# Patient Record
(demographics unavailable — no encounter records)

---

## 2024-10-23 NOTE — END OF VISIT
[FreeTextEntry3] : I, Mark Hurst MD, personally saw and examined this patient. I developed the treatment plan and authored this note.

## 2024-10-23 NOTE — ASSESSMENT
[FreeTextEntry1] : 15-year-old male with right lateral malleolus fracture with widening and lateral translation of the talus on the gravity stress view (bimalleolar equivalent ankle fracture) sustained on 10/16/2024 while participating in football.  -We discussed KWAN' history, physical exam, and all available radiographs at length during today's visit with patient and his parent/guardian who served as an independent historian due to child's age and unreliable nature of history. -Documentation of [facility] emergency department was reviewed today -Right ankle, knee, and tibia/fib radiographs obtained at outside facility on 10/16/2024 were independently reviewed today.  They are remarkable for an acute minimally displaced spiral fracture of the distal right fibula metadiaphysis.  Soft tissue edema surrounding the ankle joint. -Right ankle radiographs, 3 views, in splint were obtained and independently reviewed during today's visit.  Again noted is the acute minimally displaced spiral fracture about the lateral malleolus.  No evidence of periosteal reaction or bridging callus formation.  No definitive widening of the medial clear space. -Right ankle GRAVITY STRESS mortise view out of splint was also obtained and independently reviewed today.  Noted is lateral translation of the talar dome with approximately 6.5 to 7 mm widening about the medial clear space.  Increased diastases about the lateral malleolus fracture. -The etiology, pathoanatomy, treatment modalities, and expected natural history of the injury were discussed at length today. -Clinically, he is over doing well.  He has persistent moderate swelling about the right ankle with medially and laterally based discomfort. -We discussed his fracture morphology and current alignment at length during today's visit.  A gravity stress view was performed which was remarkable for increased diastases about the lateral malleolus with lateral translation of the talus and increase in medial clear space.  This is consistent with disruption of the deltoid ligament and a bimalleolar equivalent type ankle fracture.  Given inherent instability of bimalleolar equivalent ankle fractures, we recommended proceeding with formal operative intervention.  Continued conservative management carries increased risk of a poor functional outcome, chronic ankle pain, chronic ankle instability, and/or early onset posttraumatic arthritis.  Goals of surgery are to restore ankle stability and anatomic alignment.  The procedure of open reduction internal fixation of the lateral malleolus was discussed at length during today's visit.  The risks and benefits were also discussed.  The risks include, but are not limited to, hardware failure, malunion, nonunion, scarring, infection, wound dehiscence, chronic pain, chronic ankle stiffness, chronic ankle instability, chronic limp, hardware prominence, need for additional surgical procedures, damage nearby nerves, vessels, tissues.  Specifically, we discussed risk of injury to the superficial peroneal nerve which courses in the geographic location of the planned surgical field.  We also discussed the following fixation of the lateral malleolus, an intraoperative stress test of the ankle will be performed to assess stability of the syndesmotic ligaments.  If additional instability is found at that time, we will proceed with fixation of the syndesmosis.  Postoperative course was also discussed including initial need for cast immobilization and nonweightbearing restrictions.  The patient and his family asked appropriate questions, all of which were answered in detail.  They elected to proceed. -Our surgical schedulers will contact the family to arrange a surgical date.  All preoperative paperwork was completed today. -In the interim, he was placed into a well-padded and molded short leg cast.  Cast care instructions reviewed. -Strict nonweightbearing on the right lower extremity.  Crutches/wheelchair at all times. -Rest and elevation -OTC NSAIDs as needed -Absolutely no gym, sports, rough play, etc.  School note provided today for accommodations and transportation. -We will plan to see Kwan back in clinic for his first postoperative follow-up   The above plan was discussed at length with the patient and his family. All questions were answered. They verbalized understanding and were in complete agreement.

## 2024-10-23 NOTE — REVIEW OF SYSTEMS
[Change in Activity] : change in activity [Limping] : limping [Joint Pains] : arthralgias [Joint Swelling] : joint swelling  [Fever Above 102] : no fever [Malaise] : no malaise [Rash] : no rash [Itching] : no itching [Eye Pain] : no eye pain [Redness] : no redness [Nasal Stuffiness] : no nasal congestion [Heart Problems] : no heart problems [Murmur] : no murmur [Wheezing] : no wheezing [Cough] : no cough [Asthma] : no asthma [Vomiting] : no vomiting [Diarrhea] : no diarrhea [Constipation] : no constipation [Kidney Infection] : no kidney infection [Bladder Infection] : no bladder infection [Sleep Disturbances] : ~T no sleep disturbances

## 2024-10-23 NOTE — HISTORY OF PRESENT ILLNESS
[FreeTextEntry1] : Kwan is a 15-year-old male who presents to clinic today for initial evaluation of a right ankle injury.  Per report, he was participating in football on 10/16/2024 when another player fell onto his right leg and he subsequently twisted his ankle.  He felt a pop followed by immediate pain and inability to bear weight.  There was near immediate swelling about the ankle.  He then presented to an urgent care center where radiographs were concerning for a displaced ankle fracture.  He then presented to the [facility] [place] department where radiographs confirmed a fracture of the lateral malleolus.  He was noted to be moderately swollen.  He was grossly neurovascularly intact.  He was placed into a trilaminar splint.  He was discharged home and referred to our office for further evaluation and management.  Today, Kwan presents to the office with both of his parents.  He continues to localize discomfort to the right ankle.  He has been compliant with all splint care instructions and activity restrictions.  He has remained fully nonweightbearing on the right lower extremity.  He has been elevating the right lower extremity.  He is able to actively flex and extend all toes while in the splint without difficulty or discomfort.  He denies any pain about the ipsilateral knee or hip.  No pain in any other extremity.  He also denies any numbness or tingling throughout the entirety of the right lower extremity.   used no

## 2024-10-23 NOTE — PHYSICAL EXAM
[FreeTextEntry1] : GENERAL: alert, cooperative, in NAD SKIN: The skin is intact, warm, pink and dry over the area examined. EYES: Normal conjunctiva, normal eyelids and pupils were equal and round. ENT: normal ears, normal nose and normal lips. CARDIOVASCULAR: brisk capillary refill, but no peripheral edema. RESPIRATORY: The patient is in no apparent respiratory distress. They're taking full deep breaths without use of accessory muscles or evidence of audible wheezes or stridor without the use of a stethoscope. Normal respiratory effort. ABDOMEN: not examined.  Right Lower Extremity: - Provisional splint was in place. Removed today for examination and stress radiographs. - No gross deformity - Significant swelling over lateral malleolus and medial ankle - No ecchymoses, no erythema - No breaks in skin, no abrasions.  No fracture blisters.  Positive skin wrinkling. - Significant tenderness to palpation over lateral malleolus and deltoid ligament - No tenderness over tibial shaft, proximal fibula, medial malleolus, or remainder of foot - Ankle ROM is deferred due to guarding and laterally based pain - Able to flex/extend all toes without discomfort - EHL/FHL is intact and 5/5 - Significant discomfort with external rotation stress test - Foot is warm and appears well perfused - 2+ and easily palpable dorsalis pedis and posterior tibial pulses - Sensation is grossly intact throughout the right lower extremity including in the superficial peroneal, deep peroneal, tibial, saphenous, and sural nerve distributions - No evidence of lymphedema  Gait: Patient presents to the office ambulating with the assistance of bilateral crutches maintaining strict nonweightbearing precautions on the right lower extremity.

## 2024-10-23 NOTE — REASON FOR VISIT
[Initial Evaluation] : an initial evaluation [Patient] : patient [Parents] : parents [FreeTextEntry1] : Right lateral malleolus fracture, bimalleolar equivalent injury. Date of injury: 10/16/2024

## 2024-10-23 NOTE — DATA REVIEWED
[de-identified] : Right ankle radiographs, 3 views, in splint were obtained and independently reviewed during today's visit.  Again noted is the acute minimally displaced spiral fracture about the lateral malleolus.  No evidence of periosteal reaction or bridging callus formation.  No definitive widening of the medial clear space.  Right ankle GRAVITY STRESS mortise view out of splint was also obtained and independently reviewed today.  Noted is lateral translation of the talar dome with approximately 6.5 to 7 mm widening about the medial clear space.  Increased diastases about the lateral malleolus fracture.  Right ankle, knee, and tibia/fib radiographs obtained at outside facility on 10/16/2024 were independently reviewed today.  They are remarkable for an acute minimally displaced spiral fracture of the distal right fibula metadiaphysis.  Soft tissue edema surrounding the ankle joint.

## 2024-11-19 NOTE — POST OP
[___ Weeks Post Op] : [unfilled] weeks post op [0] : no pain reported [Nausea] : no nausea [Vomiting] : no vomiting [Excellent Pain Control] : has excellent pain control [No Sign of Infection] : is showing no signs of infection [de-identified] : Open reduction and internal fixation of right lateral malleolus fracture. DOS: 10/31/24 [de-identified] : Kwan is a 15-year-old male who sustained a right lateral malleolus fracture with widening and lateral translation of the talus on the gravity stress view XRs (bimalleolar equivalent ankle fracture) sustained on 10/16/2024 while participating in football. He was indicated for surgical intervention and underwent R distal fibula ORIF on 10/31/24, and was placed into a SLC. He tolerated procedure well.   Today he returns for post operative f/u. He reports doing well. He denies pain, he is no longer needing any pain medication. He denies numbness/tingling. No fevers. Tolerating cast well, compliant with NWB RLE, using crutches or a scooter. [de-identified] : RLE:  Cast is well fitting, c/d/i. The padding is intact with no signs of skin irritation. No pressure sores or abrasions noted around the cast. There is no swelling. NVI, SILT. Moving digits freely.  WWP distally, brisk cap refill Examination of pulses is deferred due to overlying cast material. [de-identified] : XR R ankle 3 views IN CAST obtained and independently reviewed in our office today: R fibular fracture well reduced with hardware in place. Mortise intact. [de-identified] : 15 yo M approximately 2 weeks s/p R distal fibula ORIF. Overall, doing very well. [de-identified] : -We discussed ABEL's interval progress, physical exam, and all available radiographs at length during today's visit with patient and his parent/guardian who served as an independent historian due to child's age and unreliable nature of history. -The surgery and intraoperative findings reviewed -Continue current SLC, cast care reviewed.  -NWB RLE -Rest and elevation -Tylenol or motrin as needed.  -No gym/sport -Return in 2 weeks for cast removal, new XRs and transition to boot (discussed with family how to order boot prior to next visit, and to bring with them to next visit).  -At f/u obtain XR R ankle OOC.    All questions answered. Family expressed understanding and agreement with the above.  I, Awilda Strickland PA-C, acted as scribe and documented the above for Dr. Hurst.

## 2024-11-19 NOTE — END OF VISIT
[FreeTextEntry3] : IMark MD, personally saw and evaluated the patient and developed the plan as documented above. I concur or have edited the note as appropriate.

## 2024-11-19 NOTE — POST OP
[___ Weeks Post Op] : [unfilled] weeks post op [0] : no pain reported [Nausea] : no nausea [Vomiting] : no vomiting [Excellent Pain Control] : has excellent pain control [No Sign of Infection] : is showing no signs of infection [de-identified] : Open reduction and internal fixation of right lateral malleolus fracture. DOS: 10/31/24 [de-identified] : Kwan is a 15-year-old male who sustained a right lateral malleolus fracture with widening and lateral translation of the talus on the gravity stress view XRs (bimalleolar equivalent ankle fracture) sustained on 10/16/2024 while participating in football. He was indicated for surgical intervention and underwent R distal fibula ORIF on 10/31/24, and was placed into a SLC. He tolerated procedure well.   Today he returns for post operative f/u. He reports doing well. He denies pain, he is no longer needing any pain medication. He denies numbness/tingling. No fevers. Tolerating cast well, compliant with NWB RLE, using crutches or a scooter. [de-identified] : RLE:  Cast is well fitting, c/d/i. The padding is intact with no signs of skin irritation. No pressure sores or abrasions noted around the cast. There is no swelling. NVI, SILT. Moving digits freely.  WWP distally, brisk cap refill Examination of pulses is deferred due to overlying cast material. [de-identified] : XR R ankle 3 views IN CAST obtained and independently reviewed in our office today: R fibular fracture well reduced with hardware in place. Mortise intact. [de-identified] : 15 yo M approximately 2 weeks s/p R distal fibula ORIF. Overall, doing very well. [de-identified] : -We discussed ABEL's interval progress, physical exam, and all available radiographs at length during today's visit with patient and his parent/guardian who served as an independent historian due to child's age and unreliable nature of history. -The surgery and intraoperative findings reviewed -Continue current SLC, cast care reviewed.  -NWB RLE -Rest and elevation -Tylenol or motrin as needed.  -No gym/sport -Return in 2 weeks for cast removal, new XRs and transition to boot (discussed with family how to order boot prior to next visit, and to bring with them to next visit).  -At f/u obtain XR R ankle OOC.    All questions answered. Family expressed understanding and agreement with the above.  I, Awilda Strickland PA-C, acted as scribe and documented the above for Dr. Hurst.

## 2024-11-27 NOTE — POST OP
[___ Weeks Post Op] : [unfilled] weeks post op [0] : no pain reported [Nausea] : no nausea [Vomiting] : no vomiting [Excellent Pain Control] : has excellent pain control [No Sign of Infection] : is showing no signs of infection [de-identified] : Open reduction and internal fixation of right lateral malleolus fracture. DOS: 10/31/24 [de-identified] : Kwan is a 15-year-old male who sustained a right lateral malleolus fracture with widening and lateral translation of the talus on the gravity stress view XRs (bimalleolar equivalent ankle fracture) sustained on 10/16/2024 while participating in football. He was indicated for surgical intervention and underwent R distal fibula ORIF on 10/31/24, and was placed into a SLC. He tolerated procedure well. He was discharged home without complication. On initial post operative appointment he was doing well and his short leg cast was continued.   Today he returns for post operative f/u. He reports doing well. He denies pain, he is not needing any pain medication. He denies numbness/tingling. No fevers. Tolerating cast well, compliant with NWB RLE, using crutches or a scooter.  There have been no fevers, chills, night sweats, or any other constitutional signs/symptoms concerning for post-operative infection.  [de-identified] : RLE:  - Short-leg cast is in place. Good condition. Removed today for examination - Steri strips about the laterally based incision removed today for examination. Incision is noted to be healing well with no signs of infection - No other skin irritation or breakdown  -  Expected mild swelling about the ankle - No pain with gentle passive range of motion of the ankle - Able to fully flex and extend all toes without discomfort - No pain with passive stretch of the toes - Toes are warm and appear well perfused with brisk capillary refill - +2 DP pulse - Sensation is grossly intact to all exposed portions of the lower extremity - No evidence of lymphedema  Gait: Patient is seen using a knee scooter to remain non weight bearing on the right lower extremity  [de-identified] : XR R ankle 3 views OUT OF CAST obtained and independently reviewed in our office today: R fibular fracture well reduced with hardware in place. Mortise intact. [de-identified] : 15 yo M approximately 4 weeks s/p R distal fibula ORIF. Overall, doing very well. [de-identified] : -We discussed ABEL's interval progress, physical exam, and all available radiographs at length during today's visit with patient and his parent/guardian who served as an independent historian due to child's age and unreliable nature of history. - Clinically, he is doing well with no pain about the ankle. He toleraetd his short leg cast well.  -His short leg cast was removed today and he tolerated the procedure well -He was then placed into a properly fitting CAM walking boot. Boot care instructions reviewed. -Boot should be on at all times except for sleep, hygiene, and while resting on the couch -Additionally, he will remove the brace daily to work on ROM exercises. Sample exercises were demonstrated today. -He should continue to remain nonweight bearing for the next 2 weeks. After 2 weeks he may begin to weight bear as tolerated while in his CAM walking boot only. He should wean off crutches as he is able at that time. -OTC NSAIDs as needed -Absolutely no gym, recess, sports, rough play.  School note provided today. -We will plan to see him back in clinic in approximately 3 weeks for reevaluation and new right ankle radiographs. Anticipate initiation of physical therapy at that time.   All questions and concerns were addressed today. Parent and patient verbalize understanding and agree with plan of care.   I, Ana M Bergeron, have acted as a scribe and documented the above information for Dr. Hurst

## 2024-12-18 NOTE — POST OP
[___ Weeks Post Op] : [unfilled] weeks post op [0] : no pain reported [No Sign of Infection] : is showing no signs of infection [Nausea] : no nausea [Vomiting] : no vomiting [de-identified] : Open reduction and internal fixation of right lateral malleolus fracture. DOS: 10/31/24 [de-identified] : Kwan is a 15-year-old male who sustained a right lateral malleolus fracture with widening and lateral translation of the talus on the gravity stress view XRs (bimalleolar equivalent ankle fracture) sustained on 10/16/2024 while participating in football. He was indicated for surgical intervention and underwent R distal fibula ORIF on 10/31/24, and was placed into a SLC. He tolerated procedure well. He was discharged home without complication. On initial post operative appointment he was doing well and his short leg cast was continued. He was transitioned to a CAM walking boot on 11/27/24. Please see prior clinic notes for additional information.   Today he returns for post operative f/u. He reports doing well. He denies pain, he is not needing any pain medication. He denies numbness/tingling. No fevers. Tolerating his CAM boot well. He started to weight bear in his CAM boot 1 week ago. No pain with ambulation.  There have been no fevers, chills, night sweats, or any other constitutional signs/symptoms concerning for post-operative infection.  [de-identified] : RLE:  - Incision is noted to be well healed with no signs of infection - No other skin irritation or breakdown  -  Expected mild swelling about the ankle - No pain with gentle passive range of motion of the ankle - Able to fully flex and extend all toes without discomfort - No pain with passive stretch of the toes - Toes are warm and appear well perfused with brisk capillary refill - +2 DP pulse - Sensation is grossly intact to all exposed portions of the lower extremity - No evidence of lymphedema  Gait: Patient is seen bearing full weight on the bilateral lower extremities in a CAM walking boot on the right. [de-identified] : XR R ankle 3 views obtained and independently reviewed in our office today: Right fibular fracture well reduced with hardware in place. Mortise intact. [de-identified] : 15 yo M approximately 6.5 weeks s/p R distal fibula ORIF. Overall, doing very well. [de-identified] : -We discussed ABEL's interval progress, physical exam, and all available radiographs at length during today's visit with patient and his parent/guardian who served as an independent historian due to child's age and unreliable nature of history. - Clinically, he is doing well with no pain about the ankle. He is tolerating his CAM boot well.  - Recommendation at this time is to continue with his CAM walking boot. Boot care instructions reviewed. -Boot should be on at all times except for sleep, hygiene, and while resting on the couch -Additionally, he will remove the brace daily to work on ROM exercises. Sample exercises were demonstrated today. - He should also begin a formal course of physical therapy. A prescription was provided today -He may weight bear as tolerated while in his CAM walking boot only.  -OTC NSAIDs as needed -Absolutely no gym, recess, sports, rough play.  School note provided today. -We will plan to see him back in clinic in approximately 4 weeks for reevaluation and new right ankle radiographs. Anticipate discontinuation of the boot at that time.  All questions and concerns were addressed today. Parent and patient verbalize understanding and agree with plan of care.   I, Ana M Bergeron, have acted as a scribe and documented the above information for Dr. Hurst   This note was created using Dragon Voice Recognition Software and may have been partially created using [employer] software which was then reviewed and edited to the best of my ability. Sporadic inaccurate translation may have occurred. If there are any questions about content of the note, please contact the office for clarification.

## 2025-01-21 NOTE — POST OP
[___ Weeks Post Op] : [unfilled] weeks post op [0] : no pain reported [No Sign of Infection] : is showing no signs of infection [Nausea] : no nausea [Vomiting] : no vomiting [Excellent Pain Control] : has excellent pain control [de-identified] : Open reduction and internal fixation of right lateral malleolus fracture. DOS: 10/31/24 [de-identified] : Kwan is a 15-year-old male who sustained a right lateral malleolus fracture with widening and lateral translation of the talus on the gravity stress view XRs (bimalleolar equivalent ankle fracture) sustained on 10/16/2024 while participating in football. He was indicated for surgical intervention and underwent R distal fibula ORIF on 10/31/24, and was placed into a SLC. He tolerated procedure well. He was discharged home without complication. On initial post operative appointment he was doing well and his short leg cast was continued. He was transitioned to a CAM walking boot on 11/27/24.  On 12/18/2024 he was noted to be doing well and physical therapy was initiated.  Please see prior clinic notes for additional information.   Today he returns for post operative f/u. He reports doing well. He denies pain, he is not needing any pain medication. He denies numbness/tingling. No fevers. Tolerating his CAM boot well. He has been weight bearing in his CAM boot. No pain with ambulation.  There have been no fevers, chills, night sweats, or any other constitutional signs/symptoms concerning for post-operative infection.  [de-identified] : RLE:  - Incision is noted to be well healed with no signs of infection - No other skin irritation or breakdown  -  Expected mild swelling about the ankle - No pain with gentle passive range of motion of the ankle - Able to fully flex and extend all toes without discomfort - No pain with passive stretch of the toes - Toes are warm and appear well perfused with brisk capillary refill - +2 DP pulse - Sensation is grossly intact to all exposed portions of the lower extremity - No evidence of lymphedema  Gait: Patient is seen bearing full weight on the bilateral lower extremities in a CAM walking boot on the right. [de-identified] : XR R ankle 3 views obtained and independently reviewed in our office today: Right fibular fracture well reduced with hardware in place. Mortise intact. [de-identified] : 15 yo M approximately 11 weeks s/p R distal fibula ORIF. Overall, doing very well. [de-identified] : -We discussed ABEL's interval progress, physical exam, and all available radiographs at length during today's visit with patient and his parent/guardian who served as an independent historian due to child's age and unreliable nature of history. -Clinically, he is doing well with no pain about the ankle. He is tolerating his CAM boot well.  -Recommendation at this time is to discontinue with his CAM walking boot and weight bear as tolerated in regular shoes. -He will continue to work on ROM exercises. Sample exercises were demonstrated today. - He should also continue with his formal course of physical therapy. A prescription was provided today -OTC NSAIDs as needed -Absolutely no gym, recess, sports, rough play.  He should continue with his bus to school. School note provided today. -We will plan to see him back in clinic in approximately 4 weeks for reevaluation and new right ankle radiographs.    All questions and concerns were addressed today. Parent and patient verbalize understanding and agree with plan of care.   I, Ana M Bergeron, have acted as a scribe and documented the above information for Dr. Hurst.   This note was created using Dragon Voice Recognition Software and may have been partially created using [employer] software which was then reviewed and edited to the best of my ability. Sporadic inaccurate translation may have occurred. If there are any questions about content of the note, please contact the office for clarification.

## 2025-01-21 NOTE — POST OP
[___ Weeks Post Op] : [unfilled] weeks post op [0] : no pain reported [No Sign of Infection] : is showing no signs of infection [Nausea] : no nausea [Vomiting] : no vomiting [Excellent Pain Control] : has excellent pain control [de-identified] : Open reduction and internal fixation of right lateral malleolus fracture. DOS: 10/31/24 [de-identified] : Kwan is a 15-year-old male who sustained a right lateral malleolus fracture with widening and lateral translation of the talus on the gravity stress view XRs (bimalleolar equivalent ankle fracture) sustained on 10/16/2024 while participating in football. He was indicated for surgical intervention and underwent R distal fibula ORIF on 10/31/24, and was placed into a SLC. He tolerated procedure well. He was discharged home without complication. On initial post operative appointment he was doing well and his short leg cast was continued. He was transitioned to a CAM walking boot on 11/27/24.  On 12/18/2024 he was noted to be doing well and physical therapy was initiated.  Please see prior clinic notes for additional information.   Today he returns for post operative f/u. He reports doing well. He denies pain, he is not needing any pain medication. He denies numbness/tingling. No fevers. Tolerating his CAM boot well. He has been weight bearing in his CAM boot. No pain with ambulation.  There have been no fevers, chills, night sweats, or any other constitutional signs/symptoms concerning for post-operative infection.  [de-identified] : RLE:  - Incision is noted to be well healed with no signs of infection - No other skin irritation or breakdown  -  Expected mild swelling about the ankle - No pain with gentle passive range of motion of the ankle - Able to fully flex and extend all toes without discomfort - No pain with passive stretch of the toes - Toes are warm and appear well perfused with brisk capillary refill - +2 DP pulse - Sensation is grossly intact to all exposed portions of the lower extremity - No evidence of lymphedema  Gait: Patient is seen bearing full weight on the bilateral lower extremities in a CAM walking boot on the right. [de-identified] : XR R ankle 3 views obtained and independently reviewed in our office today: Right fibular fracture well reduced with hardware in place. Mortise intact. [de-identified] : 15 yo M approximately 11 weeks s/p R distal fibula ORIF. Overall, doing very well. [de-identified] : -We discussed ABEL's interval progress, physical exam, and all available radiographs at length during today's visit with patient and his parent/guardian who served as an independent historian due to child's age and unreliable nature of history. -Clinically, he is doing well with no pain about the ankle. He is tolerating his CAM boot well.  -Recommendation at this time is to discontinue with his CAM walking boot and weight bear as tolerated in regular shoes. -He will continue to work on ROM exercises. Sample exercises were demonstrated today. - He should also continue with his formal course of physical therapy. A prescription was provided today -OTC NSAIDs as needed -Absolutely no gym, recess, sports, rough play.  He should continue with his bus to school. School note provided today. -We will plan to see him back in clinic in approximately 4 weeks for reevaluation and new right ankle radiographs.    All questions and concerns were addressed today. Parent and patient verbalize understanding and agree with plan of care.   I, Ana M Bergeron, have acted as a scribe and documented the above information for Dr. Hurst.   This note was created using Dragon Voice Recognition Software and may have been partially created using [employer] software which was then reviewed and edited to the best of my ability. Sporadic inaccurate translation may have occurred. If there are any questions about content of the note, please contact the office for clarification.

## 2025-01-21 NOTE — POST OP
[___ Weeks Post Op] : [unfilled] weeks post op [0] : no pain reported [No Sign of Infection] : is showing no signs of infection [Nausea] : no nausea [Vomiting] : no vomiting [Excellent Pain Control] : has excellent pain control [de-identified] : Open reduction and internal fixation of right lateral malleolus fracture. DOS: 10/31/24 [de-identified] : Kwan is a 15-year-old male who sustained a right lateral malleolus fracture with widening and lateral translation of the talus on the gravity stress view XRs (bimalleolar equivalent ankle fracture) sustained on 10/16/2024 while participating in football. He was indicated for surgical intervention and underwent R distal fibula ORIF on 10/31/24, and was placed into a SLC. He tolerated procedure well. He was discharged home without complication. On initial post operative appointment he was doing well and his short leg cast was continued. He was transitioned to a CAM walking boot on 11/27/24.  On 12/18/2024 he was noted to be doing well and physical therapy was initiated.  Please see prior clinic notes for additional information.   Today he returns for post operative f/u. He reports doing well. He denies pain, he is not needing any pain medication. He denies numbness/tingling. No fevers. Tolerating his CAM boot well. He has been weight bearing in his CAM boot. No pain with ambulation.  There have been no fevers, chills, night sweats, or any other constitutional signs/symptoms concerning for post-operative infection.  [de-identified] : RLE:  - Incision is noted to be well healed with no signs of infection - No other skin irritation or breakdown  -  Expected mild swelling about the ankle - No pain with gentle passive range of motion of the ankle - Able to fully flex and extend all toes without discomfort - No pain with passive stretch of the toes - Toes are warm and appear well perfused with brisk capillary refill - +2 DP pulse - Sensation is grossly intact to all exposed portions of the lower extremity - No evidence of lymphedema  Gait: Patient is seen bearing full weight on the bilateral lower extremities in a CAM walking boot on the right. [de-identified] : XR R ankle 3 views obtained and independently reviewed in our office today: Right fibular fracture well reduced with hardware in place. Mortise intact. [de-identified] : 15 yo M approximately 11 weeks s/p R distal fibula ORIF. Overall, doing very well. [de-identified] : -We discussed ABEL's interval progress, physical exam, and all available radiographs at length during today's visit with patient and his parent/guardian who served as an independent historian due to child's age and unreliable nature of history. -Clinically, he is doing well with no pain about the ankle. He is tolerating his CAM boot well.  -Recommendation at this time is to discontinue with his CAM walking boot and weight bear as tolerated in regular shoes. -He will continue to work on ROM exercises. Sample exercises were demonstrated today. - He should also continue with his formal course of physical therapy. A prescription was provided today -OTC NSAIDs as needed -Absolutely no gym, recess, sports, rough play.  He should continue with his bus to school. School note provided today. -We will plan to see him back in clinic in approximately 4 weeks for reevaluation and new right ankle radiographs.    All questions and concerns were addressed today. Parent and patient verbalize understanding and agree with plan of care.   I, Ana M Bergeron, have acted as a scribe and documented the above information for Dr. Hurst.   This note was created using Dragon Voice Recognition Software and may have been partially created using [employer] software which was then reviewed and edited to the best of my ability. Sporadic inaccurate translation may have occurred. If there are any questions about content of the note, please contact the office for clarification.

## 2025-03-30 NOTE — DATA REVIEWED
[de-identified] : XR R ankle 3 views obtained and independently reviewed in our office today: Right fibular fracture well reduced with hardware in place. Fracture is fully healed. Mortise intact.

## 2025-03-30 NOTE — REVIEW OF SYSTEMS
[Fever Above 102] : no fever [Malaise] : no malaise [Rash] : no rash [Itching] : no itching [Eye Pain] : no eye pain [Redness] : no redness [Nasal Stuffiness] : no nasal congestion [Heart Problems] : no heart problems [Murmur] : no murmur [Wheezing] : no wheezing [Cough] : no cough [Asthma] : no asthma [Vomiting] : no vomiting [Diarrhea] : no diarrhea [Constipation] : no constipation [Kidney Infection] : no kidney infection [Bladder Infection] : no bladder infection [Limping] : no limping [Joint Pains] : no arthralgias [Joint Swelling] : no joint swelling [Sleep Disturbances] : ~T no sleep disturbances

## 2025-03-30 NOTE — HISTORY OF PRESENT ILLNESS
[FreeTextEntry1] : Kwan is a 15-year-old male who presents today for follow up s/p Open reduction and internal fixation of right lateral malleolus fracture. DOS: 10/31/24. He sustained a right lateral malleolus fracture with widening and lateral translation of the talus on the gravity stress view XRs (bimalleolar equivalent ankle fracture) sustained on 10/16/2024 while participating in football. He was indicated for surgical intervention and underwent R distal fibula ORIF on 10/31/24 and was placed into a SLC. He tolerated procedure well. He was discharged home without complication. On initial post operative appointment, he was doing well, and his short leg cast was continued. He was transitioned to a CAM walking boot on 11/27/24. On 12/18/2024 he was noted to be doing well, and physical therapy was initiated. Please see prior clinic notes for additional information.  Today father reports that he is doing well denies any pain or discomfort. He is not taking any pain medication. He denies numbness/tingling. He is participating physical therapy. He is able to bear weight on his right lower extremity. No pain with ambulation. Here for further orthopedic evaluation.

## 2025-03-30 NOTE — REASON FOR VISIT
[Follow Up] : a follow up visit [Father] : father [Patient] : patient [FreeTextEntry1] : s/p Open reduction and internal fixation of right lateral malleolus fracture. DOS: 10/31/24.

## 2025-03-30 NOTE — PHYSICAL EXAM
[FreeTextEntry1] : Gait: Presents ambulating independently without signs of antalgia. Good coordination and balance noted.  GENERAL: alert, cooperative, in NAD SKIN: The skin is intact, warm, pink and dry over the area examined. EYES: Normal conjunctiva, normal eyelids and pupils were equal and round. ENT: normal ears, normal nose and normal lips. CARDIOVASCULAR: brisk capillary refill, but no peripheral edema. RESPIRATORY: The patient is in no apparent respiratory distress. They're taking full deep breaths without use of accessory muscles or evidence of audible wheezes or stridor without the use of a stethoscope. Normal respiratory effort. ABDOMEN: not examined  RLE: - No gross deformity - Incision is noted to be well healed with no signs of infection - No other skin irritation or breakdown - No swelling about the ankle - Full ROM. Symmetric. Painless. - 5/5 motor strength with ankle DF/PF - Able to fully flex and extend all toes without discomfort - No pain with passive stretch of the toes - Toes are warm and appear well perfused with brisk capillary refill - +2 DP pulse - Sensation is grossly intact to all portions of the lower extremity - No evidence of lymphedema

## 2025-03-30 NOTE — ASSESSMENT
[FreeTextEntry1] : 15-year-old male with s/p Open reduction and internal fixation of right lateral malleolus fracture. DOS: 10/31/24. Overall, doing very well.  -We discussed ABEL's interval progress, physical exam, and all available radiographs at length during today's visit with patient and his parent/guardian who served as an independent historian due to child's age and unreliable nature of history. -XR R ankle 3 views obtained and independently reviewed in our office today: Right fibular fracture well reduced with hardware in place. Fracture is fully healed. Mortise intact. -The etiology, pathoanatomy, treatment modalities, and expected natural history of the injury were discussed at length today. -Clinically he is doing well without any discomfort or pain and has full ROM. -He may resume all physical activities without restrictions. School note was provided. -WBAT RLE -We will plan to see him back in clinic in approximately 4-5 months for reevaluation and new right ankle radiographs. We will discuss about hardware removal at that time.   All questions and concerns were addressed today. Parent and patient verbalize understanding and agree with plan of care.  I, Macey Montes De Oca, have acted as a scribe and documented the above information for Dr. Hurst.

## 2025-03-30 NOTE — DATA REVIEWED
[de-identified] : XR R ankle 3 views obtained and independently reviewed in our office today: Right fibular fracture well reduced with hardware in place. Fracture is fully healed. Mortise intact.